# Patient Record
Sex: MALE | Race: WHITE | NOT HISPANIC OR LATINO | Employment: FULL TIME | URBAN - METROPOLITAN AREA
[De-identification: names, ages, dates, MRNs, and addresses within clinical notes are randomized per-mention and may not be internally consistent; named-entity substitution may affect disease eponyms.]

---

## 2024-05-06 ENCOUNTER — APPOINTMENT (OUTPATIENT)
Dept: RADIOLOGY | Facility: HOSPITAL | Age: 54
End: 2024-05-06
Payer: COMMERCIAL

## 2024-05-06 ENCOUNTER — HOSPITAL ENCOUNTER (EMERGENCY)
Facility: HOSPITAL | Age: 54
Discharge: HOME | End: 2024-05-06
Payer: COMMERCIAL

## 2024-05-06 VITALS
HEIGHT: 67 IN | SYSTOLIC BLOOD PRESSURE: 121 MMHG | OXYGEN SATURATION: 98 % | BODY MASS INDEX: 26.68 KG/M2 | WEIGHT: 170 LBS | DIASTOLIC BLOOD PRESSURE: 79 MMHG | RESPIRATION RATE: 18 BRPM | HEART RATE: 65 BPM | TEMPERATURE: 98.5 F

## 2024-05-06 DIAGNOSIS — S92.314A: Primary | ICD-10-CM

## 2024-05-06 PROCEDURE — 73630 X-RAY EXAM OF FOOT: CPT | Mod: RIGHT SIDE | Performed by: RADIOLOGY

## 2024-05-06 PROCEDURE — 99284 EMERGENCY DEPT VISIT MOD MDM: CPT | Mod: 25

## 2024-05-06 PROCEDURE — 29515 APPLICATION SHORT LEG SPLINT: CPT | Mod: RT

## 2024-05-06 PROCEDURE — 2500000001 HC RX 250 WO HCPCS SELF ADMINISTERED DRUGS (ALT 637 FOR MEDICARE OP): Performed by: PHYSICIAN ASSISTANT

## 2024-05-06 PROCEDURE — 73700 CT LOWER EXTREMITY W/O DYE: CPT | Mod: RT

## 2024-05-06 PROCEDURE — 73630 X-RAY EXAM OF FOOT: CPT | Mod: RT

## 2024-05-06 RX ORDER — IBUPROFEN 600 MG/1
600 TABLET ORAL ONCE
Status: COMPLETED | OUTPATIENT
Start: 2024-05-06 | End: 2024-05-06

## 2024-05-06 RX ORDER — ACETAMINOPHEN 325 MG/1
975 TABLET ORAL ONCE
Status: COMPLETED | OUTPATIENT
Start: 2024-05-06 | End: 2024-05-06

## 2024-05-06 RX ADMIN — ACETAMINOPHEN 975 MG: 325 TABLET ORAL at 10:13

## 2024-05-06 RX ADMIN — IBUPROFEN 600 MG: 600 TABLET, FILM COATED ORAL at 10:13

## 2024-05-06 ASSESSMENT — COLUMBIA-SUICIDE SEVERITY RATING SCALE - C-SSRS
1. IN THE PAST MONTH, HAVE YOU WISHED YOU WERE DEAD OR WISHED YOU COULD GO TO SLEEP AND NOT WAKE UP?: NO
6. HAVE YOU EVER DONE ANYTHING, STARTED TO DO ANYTHING, OR PREPARED TO DO ANYTHING TO END YOUR LIFE?: NO
2. HAVE YOU ACTUALLY HAD ANY THOUGHTS OF KILLING YOURSELF?: NO

## 2024-05-06 ASSESSMENT — PAIN SCALES - GENERAL
PAINLEVEL_OUTOF10: 7
PAINLEVEL_OUTOF10: 8

## 2024-05-06 NOTE — ED TRIAGE NOTES
TRIAGE NOTE   I saw the patient as the Clinician in Triage and performed a brief history and physical exam, established acuity, and ordered appropriate tests to develop basic plan of care. Patient will be seen by an [facility], resident and/or physician who will independently evaluate the patient. Please see subsequent provider notes for further details and disposition.      Brief HPI:   Patient is an otherwise healthy 53-year-old male who presents to the ED for evaluation of right foot and great toe pain after he got his foot crushed between a pallet marjorie and the wall earlier this morning while he was at work.  He rates the pain 8/10 when he is walking on it 4/10 when he is sitting still.  He has not taken any medication for the pain.  Denies any numbness, tingling, extremity weakness.  Is not on anticoagulants.  Is any other symptoms including no fever, chills, chest pain shortness of breath, knee pain or leg pain.     Focused Physical exam:   Swelling and erythema with TTP over the right first MTP of right foot.  Normal cap refill.  Sensation intact.  Normal ROM of ankle.    Plan/MDM:   Tylenol, ibuprofen, x-ray imaging    Please see subsequent provider note for further details and disposition

## 2024-05-06 NOTE — ED PROVIDER NOTES
Chief Complaint   Patient presents with    Foot Injury     HPI:   Nikko Lin is an 53 y.o. male who presents to the ED for evaluation of right foot and great toe pain after he got his foot crushed between a pallet jack and the wall earlier this morning while he was at work. He rates the pain 8/10 when he is walking on it 4/10 when he is sitting still. He has not taken any medication for the pain. Denies any numbness, tingling, extremity weakness. Is not on anticoagulants. Is any other symptoms including no fever, chills, chest pain shortness of breath, knee pain or leg pain.     Medications:  Soc HX:  Not on File:  No past medical history on file.  Past Surgical History:   Procedure Laterality Date    KNEE SURGERY  08/01/2018    Knee Surgery Right     No family history on file.     Physical Exam:   Right foot: Swelling and erythema with TTP over the right first MTP of right foot.  Normal cap refill.  Sensation intact.  Normal ROM of ankle.  2+ DP and PT pulse  Cardiac: RRR  Pulmonary: LCTA      VS: As documented in the triage note and EMR flowsheet from this visit were reviewed.      Medical Decision Making:   ED Course as of 05/06/24 1131   Mon May 06, 2024   0962 Reached out to [employer] [facility] because there is currently no one on-call for podiatry.  She says she is going to speak to orthopedist and get back to me through epic chat. [KA]   5745 Discussed imaging findings with patient.  He is aware that he has break of the first metatarsal and that I am waiting for surgical consult before disposition can be made.  He does not want any thing stronger than Motrin or Tylenol for pain. [KA]   1000 Sent secure images to podiatrist [KA]   2952  spoke with Dr. Scott, podiatry, he recommended I obtain CT imaging of the foot so that he can better evaluate surgical plan.  He does state that this is a surgical case.  I discussed this with patient.  CT imaging was obtained.  I spoke with Dr. Scott after CT imaging obtained and he  advised me to place patient in a splint, instruct him to be nonweightbearing and have him follow-up in his office on Thursday for surgical planning.  I discussed this with the patient and he is agreeable.  He was placed in a splint and he remains neurovascularly intact both pre and postprocedure.  He will be given crutches.  He does not want any medication sent home.  Provided work note.  He is agreeable to plan. [KA]      ED Course User Index  [KA] Felicity Che PA-C         Diagnoses as of 05/06/24 1235   Nondisp fx of first metatarsal bone, right foot, init     Splint Application    Performed by: Felicity Che PA-C  Authorized by: Felicity Che PA-C    Consent:     Consent obtained:  Verbal    Consent given by:  Patient    Risks, benefits, and alternatives were discussed: yes      Risks discussed:  Discoloration, numbness, pain and swelling    Alternatives discussed:  No treatment  Universal protocol:     Imaging studies available: yes      Patient identity confirmed:  Verbally with patient  Pre-procedure details:     Distal neurologic exam:  Normal    Distal perfusion: distal pulses strong and brisk capillary refill    Procedure details:     Location:  Foot    Foot location:  R foot    Strapping: no      Splint type:  Short leg    Supplies used: Stockinette, cotton roll, Ortho-Glass, Ace wrap x 2.    Attestation: Splint applied and adjusted personally by me    Post-procedure details:     Distal neurologic exam:  Normal    Distal perfusion: distal pulses strong and brisk capillary refill      Procedure completion:  Tolerated well, no immediate complications    Post-procedure imaging: not applicable       Escalation of Care: Appropriate for  outpatient management       Discussion of Management with Other Providers:  I discussed the patient/results with: Daniella Silveira [facility]; Dr Choudhury, podiatry    Counseling: Spoke with the patient and discussed today´s findings, in addition to providing specific details for  the plan of care and expected course.  Patient was given the opportunity to ask questions.    Discussed return precautions and importance of follow-up.  Advised to follow-up with podiatrist.  Advised to return to the ED for changing or worsening symptoms, new symptoms, complaint specific precautions, and precautions listed on the discharge paperwork.  Educated on the common potential side effects of medications prescribed.    I advised the patient that the emergency evaluation and treatment provided today doesn't end their need for medical care. It is very important that they follow-up with their primary care provider or other specialist as instructed.    The plan of care was mutually agreed upon with the patient. The patient and/or family were given the opportunity to ask questions. All questions asked today in the ED were answered to the best of my ability with today's information.    I specifically advised the patient to return to the ED for changing or worsening symptoms, worrisome new symptoms, or for any complaint specific precautions listed on the discharge paperwork.    This patient was cared for in the setting of nationwide stress on resources and staffing.    This report was transcribed using voice recognition software.  Every effort was made to ensure accuracy, however, inadvertently computerized transcription errors may be present.       Felicity Che PA-C  05/06/24 [id number]

## 2024-05-06 NOTE — Clinical Note
Nikko Lin was seen and treated in our emergency department on 5/6/2024.  He may return to work on 05/12/2024.       If you have any questions or concerns, please don't hesitate to call.      Felicity Che PA-C

## 2024-05-06 NOTE — DISCHARGE INSTRUCTIONS
please continue Tylenol and/or ibuprofen as needed.  Call Dr. Scott's office when you leave today.  He would like to see you on Thursday.  If office staff is not yet aware please let them know that he was consulted in the ER and wants to see you Thursday 5/9/2024.  Do not bear weight on your right leg until after you have seen podiatry and he is given a clearance

## 2024-05-06 NOTE — ED TRIAGE NOTES
Pt arrived to the Ed with a chief complaint of a right foot injury. Pt got it pinned by palette marjorie between the wall for approx 10 seconds. Msps intact. Swelling on top of right foot. 7/10 pain when ambulating.

## 2024-05-07 PROBLEM — S92.311A DISPLACED FRACTURE OF FIRST METATARSAL BONE, RIGHT FOOT, INITIAL ENCOUNTER FOR CLOSED FRACTURE: Status: ACTIVE | Noted: 2024-05-10

## 2024-05-07 PROBLEM — S93.324A LISFRANC DISLOCATION, RIGHT, INITIAL ENCOUNTER: Status: ACTIVE | Noted: 2024-05-10

## 2024-05-07 RX ORDER — SODIUM CHLORIDE, SODIUM LACTATE, POTASSIUM CHLORIDE, CALCIUM CHLORIDE 600; 310; 30; 20 MG/100ML; MG/100ML; MG/100ML; MG/100ML
50 INJECTION, SOLUTION INTRAVENOUS CONTINUOUS
Status: CANCELLED | OUTPATIENT
Start: 2024-05-07

## 2024-05-10 ENCOUNTER — ANESTHESIA EVENT (OUTPATIENT)
Dept: OPERATING ROOM | Facility: HOSPITAL | Age: 54
End: 2024-05-10
Payer: COMMERCIAL

## 2024-05-10 DIAGNOSIS — S92.311A DISPLACED FRACTURE OF FIRST METATARSAL BONE, RIGHT FOOT, INITIAL ENCOUNTER FOR CLOSED FRACTURE: Primary | ICD-10-CM

## 2024-05-10 DIAGNOSIS — S93.324A LISFRANC DISLOCATION, RIGHT, INITIAL ENCOUNTER: ICD-10-CM

## 2024-05-13 ENCOUNTER — HOSPITAL ENCOUNTER (OUTPATIENT)
Facility: HOSPITAL | Age: 54
Setting detail: OUTPATIENT SURGERY
Discharge: HOME | End: 2024-05-13
Attending: PODIATRIST | Admitting: PODIATRIST
Payer: COMMERCIAL

## 2024-05-13 ENCOUNTER — APPOINTMENT (OUTPATIENT)
Dept: RADIOLOGY | Facility: HOSPITAL | Age: 54
End: 2024-05-13
Payer: COMMERCIAL

## 2024-05-13 ENCOUNTER — ANESTHESIA (OUTPATIENT)
Dept: OPERATING ROOM | Facility: HOSPITAL | Age: 54
End: 2024-05-13
Payer: COMMERCIAL

## 2024-05-13 VITALS
OXYGEN SATURATION: 93 % | DIASTOLIC BLOOD PRESSURE: 77 MMHG | SYSTOLIC BLOOD PRESSURE: 134 MMHG | WEIGHT: 172.4 LBS | TEMPERATURE: 97.7 F | BODY MASS INDEX: 27.06 KG/M2 | HEART RATE: 68 BPM | RESPIRATION RATE: 12 BRPM | HEIGHT: 67 IN

## 2024-05-13 DIAGNOSIS — G89.18 POSTOPERATIVE PAIN: ICD-10-CM

## 2024-05-13 DIAGNOSIS — Z98.890 POSTOPERATIVE STATE: ICD-10-CM

## 2024-05-13 DIAGNOSIS — S92.311A DISPLACED FRACTURE OF FIRST METATARSAL BONE, RIGHT FOOT, INITIAL ENCOUNTER FOR CLOSED FRACTURE: Primary | ICD-10-CM

## 2024-05-13 PROCEDURE — 2500000005 HC RX 250 GENERAL PHARMACY W/O HCPCS: Performed by: STUDENT IN AN ORGANIZED HEALTH CARE EDUCATION/TRAINING PROGRAM

## 2024-05-13 PROCEDURE — 2500000004 HC RX 250 GENERAL PHARMACY W/ HCPCS (ALT 636 FOR OP/ED): Performed by: NURSE ANESTHETIST, CERTIFIED REGISTERED

## 2024-05-13 PROCEDURE — 7100000010 HC PHASE TWO TIME - EACH INCREMENTAL 1 MINUTE: Performed by: PODIATRIST

## 2024-05-13 PROCEDURE — 3700000001 HC GENERAL ANESTHESIA TIME - INITIAL BASE CHARGE: Performed by: PODIATRIST

## 2024-05-13 PROCEDURE — 76000 FLUOROSCOPY <1 HR PHYS/QHP: CPT | Mod: RT

## 2024-05-13 PROCEDURE — C1762 CONN TISS, HUMAN(INC FASCIA): HCPCS | Performed by: PODIATRIST

## 2024-05-13 PROCEDURE — 2500000004 HC RX 250 GENERAL PHARMACY W/ HCPCS (ALT 636 FOR OP/ED): Performed by: STUDENT IN AN ORGANIZED HEALTH CARE EDUCATION/TRAINING PROGRAM

## 2024-05-13 PROCEDURE — 2500000004 HC RX 250 GENERAL PHARMACY W/ HCPCS (ALT 636 FOR OP/ED): Performed by: PODIATRIST

## 2024-05-13 PROCEDURE — C1713 ANCHOR/SCREW BN/BN,TIS/BN: HCPCS | Performed by: PODIATRIST

## 2024-05-13 PROCEDURE — 7100000009 HC PHASE TWO TIME - INITIAL BASE CHARGE: Performed by: PODIATRIST

## 2024-05-13 PROCEDURE — A4217 STERILE WATER/SALINE, 500 ML: HCPCS | Performed by: PODIATRIST

## 2024-05-13 PROCEDURE — 2500000004 HC RX 250 GENERAL PHARMACY W/ HCPCS (ALT 636 FOR OP/ED)

## 2024-05-13 PROCEDURE — 7100000001 HC RECOVERY ROOM TIME - INITIAL BASE CHARGE: Performed by: PODIATRIST

## 2024-05-13 PROCEDURE — 2720000007 HC OR 272 NO HCPCS: Performed by: PODIATRIST

## 2024-05-13 PROCEDURE — 3600000004 HC OR TIME - INITIAL BASE CHARGE - PROCEDURE LEVEL FOUR: Performed by: PODIATRIST

## 2024-05-13 PROCEDURE — 2500000005 HC RX 250 GENERAL PHARMACY W/O HCPCS: Performed by: PODIATRIST

## 2024-05-13 PROCEDURE — 3700000002 HC GENERAL ANESTHESIA TIME - EACH INCREMENTAL 1 MINUTE: Performed by: PODIATRIST

## 2024-05-13 PROCEDURE — 3600000009 HC OR TIME - EACH INCREMENTAL 1 MINUTE - PROCEDURE LEVEL FOUR: Performed by: PODIATRIST

## 2024-05-13 PROCEDURE — 7100000002 HC RECOVERY ROOM TIME - EACH INCREMENTAL 1 MINUTE: Performed by: PODIATRIST

## 2024-05-13 PROCEDURE — 2500000005 HC RX 250 GENERAL PHARMACY W/O HCPCS: Performed by: NURSE ANESTHETIST, CERTIFIED REGISTERED

## 2024-05-13 PROCEDURE — 2780000003 HC OR 278 NO HCPCS: Performed by: PODIATRIST

## 2024-05-13 DEVICE — IMPLANTABLE DEVICE: Type: IMPLANTABLE DEVICE | Site: FOOT | Status: FUNCTIONAL

## 2024-05-13 DEVICE — PUTTY, DBX BONE 0.5ML: Type: IMPLANTABLE DEVICE | Site: FOOT | Status: FUNCTIONAL

## 2024-05-13 RX ORDER — SODIUM CHLORIDE, SODIUM LACTATE, POTASSIUM CHLORIDE, CALCIUM CHLORIDE 600; 310; 30; 20 MG/100ML; MG/100ML; MG/100ML; MG/100ML
50 INJECTION, SOLUTION INTRAVENOUS CONTINUOUS
Status: DISCONTINUED | OUTPATIENT
Start: 2024-05-13 | End: 2024-05-13 | Stop reason: HOSPADM

## 2024-05-13 RX ORDER — LIDOCAINE HYDROCHLORIDE 10 MG/ML
0.1 INJECTION, SOLUTION EPIDURAL; INFILTRATION; INTRACAUDAL; PERINEURAL ONCE
Status: CANCELLED | OUTPATIENT
Start: 2024-05-13 | End: 2024-05-13

## 2024-05-13 RX ORDER — ONDANSETRON HYDROCHLORIDE 2 MG/ML
4 INJECTION, SOLUTION INTRAVENOUS ONCE AS NEEDED
Status: CANCELLED | OUTPATIENT
Start: 2024-05-13

## 2024-05-13 RX ORDER — CEFAZOLIN SODIUM 2 G/100ML
2 INJECTION, SOLUTION INTRAVENOUS ONCE
Status: DISCONTINUED | OUTPATIENT
Start: 2024-05-13 | End: 2024-05-13 | Stop reason: HOSPADM

## 2024-05-13 RX ORDER — DIPHENHYDRAMINE HYDROCHLORIDE 50 MG/ML
12.5 INJECTION INTRAMUSCULAR; INTRAVENOUS ONCE AS NEEDED
Status: CANCELLED | OUTPATIENT
Start: 2024-05-13

## 2024-05-13 RX ORDER — FENTANYL CITRATE 50 UG/ML
INJECTION, SOLUTION INTRAMUSCULAR; INTRAVENOUS AS NEEDED
Status: DISCONTINUED | OUTPATIENT
Start: 2024-05-13 | End: 2024-05-13

## 2024-05-13 RX ORDER — SODIUM CHLORIDE, SODIUM LACTATE, POTASSIUM CHLORIDE, CALCIUM CHLORIDE 600; 310; 30; 20 MG/100ML; MG/100ML; MG/100ML; MG/100ML
100 INJECTION, SOLUTION INTRAVENOUS CONTINUOUS
Status: CANCELLED | OUTPATIENT
Start: 2024-05-13

## 2024-05-13 RX ORDER — ONDANSETRON HYDROCHLORIDE 2 MG/ML
INJECTION, SOLUTION INTRAVENOUS AS NEEDED
Status: DISCONTINUED | OUTPATIENT
Start: 2024-05-13 | End: 2024-05-13

## 2024-05-13 RX ORDER — SODIUM CHLORIDE 0.9 G/100ML
IRRIGANT IRRIGATION AS NEEDED
Status: DISCONTINUED | OUTPATIENT
Start: 2024-05-13 | End: 2024-05-13 | Stop reason: HOSPADM

## 2024-05-13 RX ORDER — ONDANSETRON 4 MG/1
4 TABLET, FILM COATED ORAL EVERY 8 HOURS PRN
Qty: 21 TABLET | Refills: 0 | Status: SHIPPED | OUTPATIENT
Start: 2024-05-13 | End: 2024-05-20

## 2024-05-13 RX ORDER — PROPOFOL 10 MG/ML
INJECTION, EMULSION INTRAVENOUS AS NEEDED
Status: DISCONTINUED | OUTPATIENT
Start: 2024-05-13 | End: 2024-05-13

## 2024-05-13 RX ORDER — LIDOCAINE HYDROCHLORIDE 20 MG/ML
INJECTION, SOLUTION EPIDURAL; INFILTRATION; INTRACAUDAL; PERINEURAL AS NEEDED
Status: DISCONTINUED | OUTPATIENT
Start: 2024-05-13 | End: 2024-05-13

## 2024-05-13 RX ORDER — MIDAZOLAM HYDROCHLORIDE 1 MG/ML
INJECTION, SOLUTION INTRAMUSCULAR; INTRAVENOUS AS NEEDED
Status: DISCONTINUED | OUTPATIENT
Start: 2024-05-13 | End: 2024-05-13

## 2024-05-13 RX ORDER — BUPIVACAINE HYDROCHLORIDE 5 MG/ML
INJECTION, SOLUTION PERINEURAL AS NEEDED
Status: DISCONTINUED | OUTPATIENT
Start: 2024-05-13 | End: 2024-05-13 | Stop reason: HOSPADM

## 2024-05-13 RX ORDER — OXYCODONE AND ACETAMINOPHEN 5; 325 MG/1; MG/1
1 TABLET ORAL EVERY 6 HOURS PRN
Qty: 28 TABLET | Refills: 0 | Status: SHIPPED | OUTPATIENT
Start: 2024-05-13 | End: 2024-05-20

## 2024-05-13 RX ORDER — OXYCODONE HYDROCHLORIDE 5 MG/1
5 TABLET ORAL EVERY 4 HOURS PRN
Status: CANCELLED | OUTPATIENT
Start: 2024-05-13

## 2024-05-13 RX ORDER — BUPIVACAINE HCL/EPINEPHRINE 0.5-1:200K
VIAL (ML) INJECTION AS NEEDED
Status: DISCONTINUED | OUTPATIENT
Start: 2024-05-13 | End: 2024-05-13

## 2024-05-13 RX ORDER — CEFAZOLIN 1 G/1
INJECTION, POWDER, FOR SOLUTION INTRAVENOUS AS NEEDED
Status: DISCONTINUED | OUTPATIENT
Start: 2024-05-13 | End: 2024-05-13

## 2024-05-13 RX ORDER — LABETALOL HYDROCHLORIDE 5 MG/ML
5 INJECTION, SOLUTION INTRAVENOUS ONCE AS NEEDED
Status: CANCELLED | OUTPATIENT
Start: 2024-05-13

## 2024-05-13 RX ADMIN — MIDAZOLAM HYDROCHLORIDE 4 MG: 1 INJECTION, SOLUTION INTRAMUSCULAR; INTRAVENOUS at 12:42

## 2024-05-13 RX ADMIN — FENTANYL CITRATE 100 MCG: 50 INJECTION, SOLUTION INTRAMUSCULAR; INTRAVENOUS at 12:42

## 2024-05-13 RX ADMIN — SODIUM CHLORIDE, POTASSIUM CHLORIDE, SODIUM LACTATE AND CALCIUM CHLORIDE: 600; 310; 30; 20 INJECTION, SOLUTION INTRAVENOUS at 12:41

## 2024-05-13 RX ADMIN — Medication 30 ML: at 12:43

## 2024-05-13 RX ADMIN — PROPOFOL 200 MG: 10 INJECTION, EMULSION INTRAVENOUS at 13:08

## 2024-05-13 RX ADMIN — ONDANSETRON 4 MG: 2 INJECTION INTRAMUSCULAR; INTRAVENOUS at 14:48

## 2024-05-13 RX ADMIN — LIDOCAINE HYDROCHLORIDE 60 MG: 20 INJECTION, SOLUTION EPIDURAL; INFILTRATION; INTRACAUDAL; PERINEURAL at 13:08

## 2024-05-13 RX ADMIN — DEXAMETHASONE SODIUM PHOSPHATE 4 MG: 4 INJECTION, SOLUTION INTRAMUSCULAR; INTRAVENOUS at 13:20

## 2024-05-13 RX ADMIN — CEFAZOLIN 2 G: 1 INJECTION, POWDER, FOR SOLUTION INTRAMUSCULAR; INTRAVENOUS at 13:14

## 2024-05-13 ASSESSMENT — PAIN SCALES - GENERAL
PAINLEVEL_OUTOF10: 0 - NO PAIN

## 2024-05-13 ASSESSMENT — COLUMBIA-SUICIDE SEVERITY RATING SCALE - C-SSRS
6. HAVE YOU EVER DONE ANYTHING, STARTED TO DO ANYTHING, OR PREPARED TO DO ANYTHING TO END YOUR LIFE?: NO
2. HAVE YOU ACTUALLY HAD ANY THOUGHTS OF KILLING YOURSELF?: NO
1. IN THE PAST MONTH, HAVE YOU WISHED YOU WERE DEAD OR WISHED YOU COULD GO TO SLEEP AND NOT WAKE UP?: NO

## 2024-05-13 ASSESSMENT — PAIN - FUNCTIONAL ASSESSMENT
PAIN_FUNCTIONAL_ASSESSMENT: 0-10
PAIN_FUNCTIONAL_ASSESSMENT: 0-10

## 2024-05-13 NOTE — ANESTHESIA PROCEDURE NOTES
Peripheral Block    Patient location during procedure: pre-op  Start time: 5/13/2024 12:35 PM  End time: 5/13/2024 12:40 PM  Reason for block: at surgeon's request and post-op pain management  Staffing  Performed: attending   Authorized by: Yeyo Car MD    Performed by: Yeyo Car MD  Preanesthetic Checklist  Completed: patient identified, IV checked, site marked, risks and benefits discussed, surgical consent, monitors and equipment checked, pre-op evaluation and timeout performed   Timeout performed at:   Peripheral Block  Patient position: laying flat  Prep: ChloraPrep and site prepped and draped  Patient monitoring: continuous pulse ox, heart rate and cardiac monitor  Block type: popliteal  Laterality: right  Injection technique: single-shot  Guidance: ultrasound guided  Local infiltration: lidocaine  Infiltration strength: 1 %  Dose: 3 mL  Needle  Needle type: short-bevel   Needle gauge: 22 G  Needle length: 8 cm  Needle localization: ultrasound guidance  Test dose: negative  Assessment  Injection assessment: negative aspiration for heme, local visualized surrounding nerve on ultrasound, no paresthesia on injection and incremental injection  Heart rate change: no  Slow fractionated injection: yes

## 2024-05-13 NOTE — H&P
Updated H&P from 5/6/2024. No changes in medical status since H&P performed in the emergency department.

## 2024-05-13 NOTE — ANESTHESIA PROCEDURE NOTES
Airway  Date/Time: 5/13/2024 1:10 PM  Urgency: elective    Airway not difficult    Staffing  Performed: CRNA   Authorized by: Yeyo Car MD    Performed by: SAM Salinas-KATHY  Patient location during procedure: OR    Indications and Patient Condition  Indications for airway management: anesthesia  Spontaneous Ventilation: absent  Sedation level: deep  Preoxygenated: yes  Patient position: sniffing  Mask difficulty assessment: 1 - vent by mask    Final Airway Details  Final airway type: supraglottic airway      Successful airway: Supraglottic airway: I gel.  Size 4     Number of attempts at approach: 1  Number of other approaches attempted: 0

## 2024-05-13 NOTE — ANESTHESIA PREPROCEDURE EVALUATION
Patient: Nikko Lin    Procedure Information       Date/Time: 05/13/24 1230    Procedures:       Right Foot Open Reduction Internal Fixation Digit Foot (Right)      Possible Right Foot Open Reduction Internal Fixation Foot (Right)    Location: [facility] OR 18 / Virtual [facility] OR    Surgeons: Mason Choudhury DPM            Relevant Problems   No relevant active problems       Clinical information reviewed:   Tobacco  Allergies  Meds   Med Hx  Surg Hx   Fam Hx  Soc Hx        NPO Detail:  NPO/Void Status  Date of Last Liquid: 05/13/24  Time of Last Liquid: 0700  Date of Last Solid: 05/12/24  Time of Last Solid: 2100         Physical Exam    Airway  Mallampati: II  TM distance: >3 FB  Neck ROM: full     Cardiovascular   Rhythm: regular  Rate: normal     Dental    Pulmonary   Breath sounds clear to auscultation     Abdominal            Anesthesia Plan    History of general anesthesia?: yes  History of complications of general anesthesia?: no    ASA 2     general     intravenous induction   Anesthetic plan and risks discussed with patient.    Plan discussed with CRNA.

## 2024-05-13 NOTE — OP NOTE
PODIATRIC OPERATIVE REPORT    LOG ID: [id number]  SURGERY/PROCEDURE DATE: 5/13/2024  OR LOCATION: [facility] OR    SURGEON(S)/PROCEDURALIST(S) AND ASSISTANT(S):  Primary: JELENA Tao DPM PGY-2    OTHER OR STAFF:  Circulator: Hazel Mcclain RN  Relief Circulator: Jennifer Zavaleta RN  Relief Scrub: Meli Heard  Scrub Person: Neftali Milan; Ann Stewart    SURGERY/PROCEDURE(S):  Right Foot 1st Metatarsal Fracture  89505 - [place] OPEN TREATMENT METATARSAL FRACTURE EACH    Possible Right Foot Open Reduction Internal Fixation Foot  45599 - [place] OPEN TREATMENT TARSOMETATARSAL JOINT DISLOCATION      PRE-OP/PRE-PROCEDURE DIAGNOSIS:   Pre-op Diagnosis     * Displaced fracture of first metatarsal bone, right foot, initial encounter for closed fracture [S92.311A]     * Lisfranc dislocation, right, initial encounter [S93.324A]    POST-OP/POST-PROCEDURE DIAGNOSIS: Same as Pre-Op    ANESTHESIA:   Anesthesia: Consult  ASA: II  Anesthesia Staff: Anesthesiologist: Yeyo Car MD  CRNA: ROBBI TrevinoCRNA; KRISTOFER Salinas  Intra-op Medications:   Administrations occurring from 1230 to 1500 on 05/13/24:   Medication Name Total Dose   sodium chloride 0.9 % irrigation solution 1,000 mL   lactated Ringer's infusion Cannot be calculated       ESTIMATED BLOOD LOSS: Minimal    SPECIMENS: No specimens collected during this procedure.    IMPLANTABLE DEVICES:  Implant Name Type Inv. Item Serial No.  Lot No. LRB No. Used Action   SCREW, EVOS LCK, T8 S-T, 2.7MM 15MM - SN/A - [id number] Screw SCREW, EVOS LCK, T8 S-T, 2.7MM 15MM N/A [employer]  Right 2 Implanted   SCREW, EVOS LCK, T8 S-T, 2.7MM 20MM - SN/A - [id number] Screw SCREW, EVOS LCK, T8 S-T, 2.7MM 20MM N/A [employer]  Right 2 Implanted   PLATE, EVOS FLEX, 2.7MM 6 HOLE - SN/A - [id number] Screw PLATE, EVOS FLEX, 2.7MM 6 HOLE N/A [employer]  Right 1 Implanted   SCREW, EVOS LCK, T8 S-T, 2.7MM 18MM - SN/A -  [id number] Screw SCREW, EVOS LCK, T8 S-T, 2.7MM 18MM N/A [employer]  Right 1 Implanted   SCREW, EVOS LCK, T8 S-T, 2.7MM 26MM - SN/A - [id number] Screw SCREW, EVOS LCK, T8 S-T, 2.7MM 26MM N/A [employer]  Right 1 Implanted   SCREW, EVOS LCK, T8 S-T, 2.7MM 30MM - SN/A - [id number] Screw SCREW, EVOS LCK, T8 S-T, 2.7MM 30MM N/A [employer]  Right 1 Implanted   pLATE tACK 10mm Base Plate  N/A [employer] [facility]  Right 1 Implanted   PUTTY, DBX BONE 0.5ML - [id number] - [id number] Graft PUTTY, DBX BONE 0.5ML [id number] MUSCULOSKELETAL TRNSPLNT FNDN  Right 1 Implanted       FINDINGS: Intraoperative findings consistent with clinical and radiographic findings.    DRAINS: None    TOURNIQUET TIMES:   Total Tourniquet Time Documented:  area (Right) - 99 minutes  Total: area (Right) - 99 minutes      COMPLICATIONS: None; patient tolerated the procedure well.       SURGERY/PROCEDURE DETAILS:  INDICATIONS FOR PROCEDURE:   The patient with diagnosis as outlined above presents for podiatric surgical intervention today. The patient has attempted and failed conservative treatment as outlined in preoperative clinic notes and wishes to proceed with surgical intervention at this time. The nature of the deformity, problems anticipated procedures, recovery/convalescence, risks/complications including but not limited to numbness, CRPS, over/under correction, problems healing of soft tissue or bone, postoperative wound infection, wound dehiscence, DVT and/or persistent pain/disability have been explained to the patient in detail. An updated H&P and consent have been completed prior to today’s surgical intervention. The patient states that they have been NPO since midnight. No guarantees were given or implied, but it is expected that the patient will have a favorable outcome.  It is with this understanding that we proceed.     PRE-PROCEDURE INFORMATION:  In pre-op holding area, the correct extremity to be  operated on, the right foot, was clearly marked and the patient verified correct laterality of the marking.  The patient was brought to the operating room and placed on the operating table in the supine position.  A timeout was performed in which identification of the correct patient, procedure, location, and materials was done. A pneumatic calf tourniquet was placed on the patient's right calf. Following IV sedation, local anesthesia was obtained utilizing 8cc of 1:1 1% lidocaine plain and 0.5% bupivicaine plain. The right foot was then scrubbed, prepped and draped in the usual aseptic manner. An Esmarch bandage was then utilized to exsanguinate the patient's right foot and the tourniquet was inflated to 250 mmHg.    DESCRIPTION OF PROCEDURE:   Fluoroscopy was introduced and was used to take stress films of the Lisfranc complex. The Lisfranc complex was noted to be stable, so operative plan switched to repairing the metatarsal fracture. Attention was directed to the dorsal right foot where a longitudinal incision was made over the first metatarsal medial to the extensor hallucis longus tendon using a 15 blade. The extensor hallucis longus tendon and neurovascular bundle were identified. Blunt dissection was performed with scissors and the tendon and neurovasculature were retracted laterally. The 15 blade was then used to incise down to bone on the medial aspect of the first metatarsal. The periosteum was dissected off the bone. The fracture site was identified. A curette was used to smooth the surfaces of the fractures and reduction was attempted and was successful with manual reduction. Fluoroscopy was used to check reduction and the first metatarsal was noted to be out to length with good alignment of the cortices and with no displacement in the frontal or transverse planes. A 6 hole plate was placed dorsally on the first metatarsal. AO technique was used to insert 2 screws proximal to the fracture site and 2  screws distal to the fracture site. A loose fragment was removed medially that was unable to be stabilized due to its size and comminution and lack of articulation with the primary fragment. Demineralized bone matrix was then used to fill the void with some of the removed fragment used as autograft. Final fluoroscopic images were taken and correct position of the plate, correct screw sizes, and adequate reduction of the fracture were noted. The surgical site was irrigated copiously with saline and closed in layers with 2-0 vicryl deep, 3-0 vicryl subcutaneously, and 4-0 nylon for skin. Dressing was placed on the surgical extremity consisting of betadine, adaptic, 4x4 gauze, Morris compression dressing, and a posterior splint.     POSTOPERATIVE INFORMATION: The patient tolerated the above noted procedure and anesthesia well and was transferred to the PACU with vital signs stable, and vascular status intact with capillary refill intact to the most distal aspect of the operative extremity. Patient will be discharged to home and follow-up in 1 week.    Justice Clemens DPM PGY-2      Attending Attestation:       SIGNATURE: Justice Clemens DPM PATIENT NAME: Nikko Lin   DATE: May 13, 2024 MRN: [id number]   TIME: 3:48 PM      I was present for the entirety of the procedure(s).     Mason Choudhury DPM

## 2024-05-13 NOTE — SIGNIFICANT EVENT
Patient discharged home, all discharge instructions reviewed with patient and family and questions answered, transported to main lobby via wheelchair

## 2024-05-14 NOTE — ANESTHESIA POSTPROCEDURE EVALUATION
Patient: Nikko Lin    Procedure Summary       Date: 05/13/24 Room / Location: [facility] 18 / [facility]    Anesthesia Start: 1249 Anesthesia Stop: 1541    Procedures:       Right Foot 1st Metatarsal Fracture (Right)      Possible Right Foot Open Reduction Internal Fixation Foot (Right) Diagnosis:       Displaced fracture of first metatarsal bone, right foot, initial encounter for closed fracture      Lisfranc dislocation, right, initial encounter      (Displaced fracture of first metatarsal bone, right foot, initial encounter for closed fracture [S92.311A])      (Lisfranc dislocation, right, initial encounter [S93.324A])    Surgeons: Mason Choudhury DPM Responsible Provider: Yeyo Car MD    Anesthesia Type: general ASA Status: 2            Anesthesia Type: general    Vitals Value Taken Time   /77 05/13/24 1609   Temp 36.5 °C (97.7 °F) 05/13/24 1600   Pulse 68 05/13/24 1608   Resp 18 05/13/24 1608   SpO2 93 % 05/13/24 1608   Vitals shown include unfiled device data.    Anesthesia Post Evaluation    Patient location during evaluation: bedside  Patient participation: complete - patient participated  Level of consciousness: awake  Pain management: adequate  Multimodal analgesia pain management approach  Airway patency: patent  Cardiovascular status: stable  Respiratory status: spontaneous ventilation and unassisted  Hydration status: acceptable  Postoperative Nausea and Vomiting: none  Comments: No significant PONV.        No notable events documented.

## 2024-05-17 ENCOUNTER — APPOINTMENT (OUTPATIENT)
Dept: RADIOLOGY | Facility: HOSPITAL | Age: 54
End: 2024-05-17
Payer: COMMERCIAL

## 2024-05-17 ENCOUNTER — HOSPITAL ENCOUNTER (EMERGENCY)
Facility: HOSPITAL | Age: 54
Discharge: HOME | End: 2024-05-17
Payer: COMMERCIAL

## 2024-05-17 VITALS
HEIGHT: 67 IN | SYSTOLIC BLOOD PRESSURE: 128 MMHG | HEART RATE: 78 BPM | DIASTOLIC BLOOD PRESSURE: 84 MMHG | RESPIRATION RATE: 18 BRPM | BODY MASS INDEX: 26.68 KG/M2 | WEIGHT: 170 LBS | TEMPERATURE: 98.6 F | OXYGEN SATURATION: 98 %

## 2024-05-17 DIAGNOSIS — G89.18 ACUTE POSTOPERATIVE PAIN: Primary | ICD-10-CM

## 2024-05-17 DIAGNOSIS — Z47.89 AFTERCARE FOR CAST OR SPLINT CHECK OR CHANGE: ICD-10-CM

## 2024-05-17 LAB
ANION GAP SERPL CALC-SCNC: 12 MMOL/L (ref 10–20)
BASOPHILS # BLD AUTO: 0.04 X10*3/UL (ref 0–0.1)
BASOPHILS NFR BLD AUTO: 0.7 %
BUN SERPL-MCNC: 20 MG/DL (ref 6–23)
CALCIUM SERPL-MCNC: 9.2 MG/DL (ref 8.6–10.3)
CHLORIDE SERPL-SCNC: 101 MMOL/L (ref 98–107)
CO2 SERPL-SCNC: 29 MMOL/L (ref 21–32)
CREAT SERPL-MCNC: 0.85 MG/DL (ref 0.5–1.3)
CRP SERPL-MCNC: 2.35 MG/DL
EGFRCR SERPLBLD CKD-EPI 2021: >90 ML/MIN/1.73M*2
EOSINOPHIL # BLD AUTO: 0.17 X10*3/UL (ref 0–0.7)
EOSINOPHIL NFR BLD AUTO: 3.1 %
ERYTHROCYTE [DISTWIDTH] IN BLOOD BY AUTOMATED COUNT: 11.9 % (ref 11.5–14.5)
ERYTHROCYTE [SEDIMENTATION RATE] IN BLOOD BY WESTERGREN METHOD: 4 MM/H (ref 0–20)
GLUCOSE SERPL-MCNC: 90 MG/DL (ref 74–99)
HCT VFR BLD AUTO: 43.3 % (ref 41–52)
HGB BLD-MCNC: 15.3 G/DL (ref 13.5–17.5)
IMM GRANULOCYTES # BLD AUTO: 0.01 X10*3/UL (ref 0–0.7)
IMM GRANULOCYTES NFR BLD AUTO: 0.2 % (ref 0–0.9)
INR PPP: 1.1 (ref 0.9–1.1)
LYMPHOCYTES # BLD AUTO: 1.27 X10*3/UL (ref 1.2–4.8)
LYMPHOCYTES NFR BLD AUTO: 23.1 %
MCH RBC QN AUTO: 32.8 PG (ref 26–34)
MCHC RBC AUTO-ENTMCNC: 35.3 G/DL (ref 32–36)
MCV RBC AUTO: 93 FL (ref 80–100)
MONOCYTES # BLD AUTO: 0.62 X10*3/UL (ref 0.1–1)
MONOCYTES NFR BLD AUTO: 11.3 %
NEUTROPHILS # BLD AUTO: 3.39 X10*3/UL (ref 1.2–7.7)
NEUTROPHILS NFR BLD AUTO: 61.6 %
NRBC BLD-RTO: 0 /100 WBCS (ref 0–0)
PLATELET # BLD AUTO: 206 X10*3/UL (ref 150–450)
POTASSIUM SERPL-SCNC: 4.8 MMOL/L (ref 3.5–5.3)
PROTHROMBIN TIME: 12 SECONDS (ref 9.8–12.8)
RBC # BLD AUTO: 4.66 X10*6/UL (ref 4.5–5.9)
SODIUM SERPL-SCNC: 137 MMOL/L (ref 136–145)
WBC # BLD AUTO: 5.5 X10*3/UL (ref 4.4–11.3)

## 2024-05-17 PROCEDURE — 73630 X-RAY EXAM OF FOOT: CPT | Mod: RT

## 2024-05-17 PROCEDURE — 85025 COMPLETE CBC W/AUTO DIFF WBC: CPT | Performed by: PODIATRIST

## 2024-05-17 PROCEDURE — 85610 PROTHROMBIN TIME: CPT | Performed by: PODIATRIST

## 2024-05-17 PROCEDURE — 29405 APPL SHORT LEG CAST: CPT | Performed by: PHYSICIAN ASSISTANT

## 2024-05-17 PROCEDURE — 73630 X-RAY EXAM OF FOOT: CPT | Mod: RIGHT SIDE | Performed by: STUDENT IN AN ORGANIZED HEALTH CARE EDUCATION/TRAINING PROGRAM

## 2024-05-17 PROCEDURE — 80048 BASIC METABOLIC PNL TOTAL CA: CPT | Performed by: PODIATRIST

## 2024-05-17 PROCEDURE — 36415 COLL VENOUS BLD VENIPUNCTURE: CPT | Performed by: PODIATRIST

## 2024-05-17 PROCEDURE — 86140 C-REACTIVE PROTEIN: CPT | Performed by: PODIATRIST

## 2024-05-17 PROCEDURE — 99284 EMERGENCY DEPT VISIT MOD MDM: CPT | Mod: 25

## 2024-05-17 PROCEDURE — 85652 RBC SED RATE AUTOMATED: CPT | Performed by: PODIATRIST

## 2024-05-17 ASSESSMENT — PAIN - FUNCTIONAL ASSESSMENT: PAIN_FUNCTIONAL_ASSESSMENT: 0-10

## 2024-05-17 ASSESSMENT — PAIN DESCRIPTION - DESCRIPTORS: DESCRIPTORS: BURNING

## 2024-05-17 ASSESSMENT — PAIN DESCRIPTION - LOCATION: LOCATION: FOOT

## 2024-05-17 ASSESSMENT — PAIN DESCRIPTION - ORIENTATION: ORIENTATION: RIGHT

## 2024-05-17 ASSESSMENT — PAIN SCALES - GENERAL: PAINLEVEL_OUTOF10: 6

## 2024-05-17 NOTE — ED TRIAGE NOTES
Pt had surgery 4 days ago on the R-foot, pt states Tuesday he started having increased pain. Pt has a follow-up appointment Monday but cannot stand the pain.

## 2024-05-17 NOTE — CONSULTS
Please contact me when results are in. Low suspicion for infection, but needs to be ruled out. Lab orders placed, along with xray order. If no signs of infection, patient may be discharged home.     Dressing: xeroform over the incision and erythema on dorsal foot and ankle. ABD pad over xeroform. Cast padding beyond the posterior splint (NO KERLIX as it may cause restriction if additional edema persists), ACE wrap (may be a little snug) to above the posterior splint and a posterior splint held in place by an ACE wrap.     Patient is to remain NWB to the foot at all times. Keep the leg elevated at or above the level of the heart at all times when possible. Ice behind the knee intermittently. Percocet q4-6 as previously ordered. May take ibuprofen as needed for pain. Mat take bendaryl for sleep and any itching for the irritation on the dorsal foot. Follow up in my office on Monday. Keep dressing clean, dry, and intact. Crutches for ambulation.    Erythema and edema appears more consistent with post op changes and possible soft tissue injury from his edema and a secondary dermatitis. No purulence noted. No drainage. No open wound. Incision is intact and well coapted. ROM is appropriate, but guarded.     If blood work and/or labs are consistent with infection, patient may needed admitted with IV antibiotics and a possible I&D. If gas is noted on XR, please obtain a CT scan. Again, low suspicion of infection.    Thank you for contacting me. I will follow his chart and certainly see him inpatient if this requires admission.    Mason Choudhury DPM

## 2024-05-18 NOTE — ED PROVIDER NOTES
HPI   Chief Complaint   Patient presents with    Post-op Problem       Is a 53-year-old male who has pain in the right leg he had a ORIF of the first metatarsal 4 days ago has had severe pain since.  Nothing makes them better or worse.  He is in a bulky splint.  No fevers or chills.  No other complaints                          Sadiq Coma Scale Score: 15                     Patient History   History reviewed. No pertinent past medical history.  Past Surgical History:   Procedure Laterality Date    KNEE SURGERY  08/01/2018    Knee Surgery Right     No family history on file.  Social History     Tobacco Use    Smoking status: Never    Smokeless tobacco: Never   Substance Use Topics    Alcohol use: Not Currently    Drug use: Never       Physical Exam   ED Triage Vitals   Temperature Heart Rate Respirations BP   05/17/24 1340 05/17/24 1340 05/17/24 1340 05/17/24 1340   37 °C (98.6 °F) 95 18 128/84      Pulse Ox Temp Source Heart Rate Source Patient Position   05/17/24 1340 05/17/24 1340 05/17/24 1600 --   97 % Oral Monitor       BP Location FiO2 (%)     -- --             Physical Exam  Vitals reviewed.   Constitutional:       General: He is not in acute distress.     Appearance: Normal appearance. He is normal weight. He is not ill-appearing, toxic-appearing or diaphoretic.   HENT:      Head: Normocephalic and atraumatic.      Right Ear: External ear normal.      Left Ear: External ear normal.      Nose: Nose normal.      Mouth/Throat:      Mouth: Mucous membranes are moist.   Eyes:      Extraocular Movements: Extraocular movements intact.      Conjunctiva/sclera: Conjunctivae normal.      Pupils: Pupils are equal, round, and reactive to light.   Cardiovascular:      Rate and Rhythm: Normal rate and regular rhythm.   Pulmonary:      Effort: Pulmonary effort is normal. No respiratory distress.      Breath sounds: No stridor.   Abdominal:      General: There is no distension.   Musculoskeletal:         General:  Tenderness present. No swelling or deformity.      Cervical back: Normal range of motion.   Skin:     General: Skin is warm.      Capillary Refill: Capillary refill takes less than 2 seconds.      Coloration: Skin is not jaundiced.      Findings: No bruising or rash.   Neurological:      General: No focal deficit present.      Mental Status: He is alert and oriented to person, place, and time. Mental status is at baseline.      Cranial Nerves: No cranial nerve deficit.      Motor: No weakness.   Psychiatric:         Mood and Affect: Mood normal.         Behavior: Behavior normal.         Thought Content: Thought content normal.         Judgment: Judgment normal.         ED Course & MDM   Diagnoses as of 05/17/24 2031   Acute postoperative pain   Aftercare for cast or splint check or change       Medical Decision Making  The right lower extremity splint was removed his pain was reduced greatly and I suspect that his wrappings were too tight especially since he developed some edema in the postoperative state.    The dorsum of the foot has some bruising and erythema there is some minor drainage from the first mtp incision.  I contacted his surgeon of record who actually came down and saw the patient with the recommendations to perform labs as well as resplint and ultimately discharge since they are normal.      Differential diagnosis is infection versus splinting too tight/improperly placed, compartment syndrome,        Procedure  Splint Application    Performed by: Doyle Shepherd PA-C  Authorized by: Doyle Shepherd PA-C    Consent:     Consent obtained:  Verbal    Consent given by:  Patient    Risks, benefits, and alternatives were discussed: yes      Risks discussed:  Discoloration, pain, swelling and numbness    Alternatives discussed:  No treatment and referral  Universal protocol:     Procedure explained and questions answered to patient or proxy's satisfaction: yes      Immediately prior to procedure a time out  was called: yes      Patient identity confirmed:  Verbally with patient  Pre-procedure details:     Distal neurologic exam:  Normal    Distal perfusion: distal pulses strong and brisk capillary refill    Procedure details:     Location:  Leg    Leg location:  R lower leg    Strapping: no      Cast type:  Short leg    Splint type:  Short leg    Supplies:  Cotton padding, elastic bandage and fiberglass  Post-procedure details:     Distal neurologic exam:  Normal    Distal perfusion: distal pulses strong and brisk capillary refill      Procedure completion:  Tolerated well, no immediate complications    Post-procedure imaging: not applicable         Doyle Shepherd PA-C  05/17/24 2033       Doyle Shepherd PA-C  05/17/24 2034

## (undated) DEVICE — Device

## (undated) DEVICE — COVER, MAYO STAND, W/PAD, 23 IN, DISPOSABLE, PLASTIC, LF, STERILE

## (undated) DEVICE — SUTURE, VICRYL, 2-0, 27 IN, SH, UNDYED

## (undated) DEVICE — SOLUTION, IRRIGATION, SODIUM CHLORIDE 0.9%, 1000 ML, POUR BOTTLE

## (undated) DEVICE — SUTURE, VICRYL, 4-0, 18 IN, PS2, UNDYED

## (undated) DEVICE — GLOVE, SURGICAL, PROTEXIS PI MICRO, 7.5, PF, LF

## (undated) DEVICE — SUTURE, ETHILON, 4-0, BLK, MONO, PS-2 18

## (undated) DEVICE — GLOVE, SURGICAL, PROTEXIS PI BLUE W/NEUTHERA, 8.0, PF, LF

## (undated) DEVICE — DRESSING, ABDOMINAL, TENDERSORB, 8 X 7-1/2 IN, STERILE

## (undated) DEVICE — CUFF, TOURNIQUET, 30 X 4, DUAL PORT/SNGL BLADDER, DISP, LF